# Patient Record
Sex: FEMALE | Race: WHITE | Employment: FULL TIME | URBAN - METROPOLITAN AREA
[De-identification: names, ages, dates, MRNs, and addresses within clinical notes are randomized per-mention and may not be internally consistent; named-entity substitution may affect disease eponyms.]

---

## 2017-07-31 LAB
CHLAMYDIA, EXTERNAL: NEGATIVE
HBSAG, EXTERNAL: NEGATIVE
HIV, EXTERNAL: NEGATIVE
N. GONORRHEA, EXTERNAL: NEGATIVE
RPR, EXTERNAL: NORMAL
RUBELLA, EXTERNAL: NORMAL

## 2018-01-31 LAB — GRBS, EXTERNAL: POSITIVE

## 2018-03-01 ENCOUNTER — HOSPITAL ENCOUNTER (INPATIENT)
Age: 28
LOS: 1 days | Discharge: HOME | DRG: 560 | End: 2018-03-02
Attending: OBSTETRICS & GYNECOLOGY | Admitting: OBSTETRICS & GYNECOLOGY
Payer: MEDICAID

## 2018-03-01 ENCOUNTER — ANESTHESIA EVENT (OUTPATIENT)
Dept: LABOR AND DELIVERY | Age: 28
DRG: 560 | End: 2018-03-01
Payer: MEDICAID

## 2018-03-01 ENCOUNTER — ANESTHESIA (OUTPATIENT)
Dept: LABOR AND DELIVERY | Age: 28
DRG: 560 | End: 2018-03-01
Payer: MEDICAID

## 2018-03-01 PROBLEM — K21.9 GERD (GASTROESOPHAGEAL REFLUX DISEASE): Status: ACTIVE | Noted: 2018-03-01

## 2018-03-01 PROBLEM — O47.9 UTERINE CONTRACTIONS DURING PREGNANCY: Status: ACTIVE | Noted: 2018-03-01

## 2018-03-01 PROBLEM — O09.293 PRIOR PREGNANCY WITH PLACENTA ABRUPTION, ANTEPARTUM, THIRD TRIMESTER: Status: ACTIVE | Noted: 2018-03-01

## 2018-03-01 LAB
ABO + RH BLD: NORMAL
BASOPHILS # BLD: 0 K/UL (ref 0–0.06)
BASOPHILS NFR BLD: 0 % (ref 0–2)
BLOOD GROUP ANTIBODIES SERPL: NORMAL
DIFFERENTIAL METHOD BLD: ABNORMAL
EOSINOPHIL # BLD: 0.1 K/UL (ref 0–0.4)
EOSINOPHIL NFR BLD: 0 % (ref 0–5)
ERYTHROCYTE [DISTWIDTH] IN BLOOD BY AUTOMATED COUNT: 13.8 % (ref 11.6–14.5)
HCT VFR BLD AUTO: 32.7 % (ref 35–45)
HGB BLD-MCNC: 10.6 G/DL (ref 12–16)
LYMPHOCYTES # BLD: 2.4 K/UL (ref 0.9–3.6)
LYMPHOCYTES NFR BLD: 14 % (ref 21–52)
MCH RBC QN AUTO: 27.5 PG (ref 24–34)
MCHC RBC AUTO-ENTMCNC: 32.4 G/DL (ref 31–37)
MCV RBC AUTO: 84.9 FL (ref 74–97)
MONOCYTES # BLD: 0.8 K/UL (ref 0.05–1.2)
MONOCYTES NFR BLD: 5 % (ref 3–10)
NEUTS SEG # BLD: 13.5 K/UL (ref 1.8–8)
NEUTS SEG NFR BLD: 81 % (ref 40–73)
PLATELET # BLD AUTO: 181 K/UL (ref 135–420)
PMV BLD AUTO: 9.8 FL (ref 9.2–11.8)
RBC # BLD AUTO: 3.85 M/UL (ref 4.2–5.3)
SPECIMEN EXP DATE BLD: NORMAL
WBC # BLD AUTO: 16.8 K/UL (ref 4.6–13.2)

## 2018-03-01 PROCEDURE — 75410000003 HC RECOV DEL/VAG/CSECN EA 0.5 HR

## 2018-03-01 PROCEDURE — 75410000002 HC LABOR FEE PER 1 HR

## 2018-03-01 PROCEDURE — 76060000078 HC EPIDURAL ANESTHESIA

## 2018-03-01 PROCEDURE — 74011000250 HC RX REV CODE- 250: Performed by: ADVANCED PRACTICE MIDWIFE

## 2018-03-01 PROCEDURE — 74011250636 HC RX REV CODE- 250/636

## 2018-03-01 PROCEDURE — 74011000258 HC RX REV CODE- 258: Performed by: ADVANCED PRACTICE MIDWIFE

## 2018-03-01 PROCEDURE — 74011250636 HC RX REV CODE- 250/636: Performed by: ADVANCED PRACTICE MIDWIFE

## 2018-03-01 PROCEDURE — 74011250637 HC RX REV CODE- 250/637: Performed by: ADVANCED PRACTICE MIDWIFE

## 2018-03-01 PROCEDURE — 4A0HXCZ MEASUREMENT OF PRODUCTS OF CONCEPTION, CARDIAC RATE, EXTERNAL APPROACH: ICD-10-PCS | Performed by: OBSTETRICS & GYNECOLOGY

## 2018-03-01 PROCEDURE — 00HU33Z INSERTION OF INFUSION DEVICE INTO SPINAL CANAL, PERCUTANEOUS APPROACH: ICD-10-PCS | Performed by: SPECIALIST

## 2018-03-01 PROCEDURE — 77030034849

## 2018-03-01 PROCEDURE — 65270000029 HC RM PRIVATE

## 2018-03-01 PROCEDURE — 74011000250 HC RX REV CODE- 250

## 2018-03-01 PROCEDURE — 77030018749 HC HK AMNIO DISP DERY -A

## 2018-03-01 PROCEDURE — 75410000000 HC DELIVERY VAGINAL/SINGLE

## 2018-03-01 PROCEDURE — 3E0R3BZ INTRODUCTION OF ANESTHETIC AGENT INTO SPINAL CANAL, PERCUTANEOUS APPROACH: ICD-10-PCS | Performed by: SPECIALIST

## 2018-03-01 PROCEDURE — 86900 BLOOD TYPING SEROLOGIC ABO: CPT | Performed by: ADVANCED PRACTICE MIDWIFE

## 2018-03-01 PROCEDURE — 77030007879 HC KT SPN EPDRL TELE -B: Performed by: SPECIALIST

## 2018-03-01 PROCEDURE — 85025 COMPLETE CBC W/AUTO DIFF WBC: CPT | Performed by: ADVANCED PRACTICE MIDWIFE

## 2018-03-01 PROCEDURE — 99282 EMERGENCY DEPT VISIT SF MDM: CPT

## 2018-03-01 PROCEDURE — 59025 FETAL NON-STRESS TEST: CPT

## 2018-03-01 RX ORDER — MINERAL OIL
30 OIL (ML) ORAL AS NEEDED
Status: COMPLETED | OUTPATIENT
Start: 2018-03-01 | End: 2018-03-01

## 2018-03-01 RX ORDER — FENTANYL CITRATE 50 UG/ML
100 INJECTION, SOLUTION INTRAMUSCULAR; INTRAVENOUS ONCE
Status: COMPLETED | OUTPATIENT
Start: 2018-03-01 | End: 2018-03-01

## 2018-03-01 RX ORDER — LIDOCAINE HYDROCHLORIDE 10 MG/ML
INJECTION INFILTRATION; PERINEURAL AS NEEDED
Status: DISCONTINUED | OUTPATIENT
Start: 2018-03-01 | End: 2018-03-01 | Stop reason: HOSPADM

## 2018-03-01 RX ORDER — FENTANYL/ROPIVACAINE/NS/PF 2MCG/ML-.1
1-15 PLASTIC BAG, INJECTION (ML) EPIDURAL
Status: DISCONTINUED | OUTPATIENT
Start: 2018-03-01 | End: 2018-03-01 | Stop reason: HOSPADM

## 2018-03-01 RX ORDER — OXYCODONE AND ACETAMINOPHEN 5; 325 MG/1; MG/1
2 TABLET ORAL
Status: DISCONTINUED | OUTPATIENT
Start: 2018-03-01 | End: 2018-03-02 | Stop reason: HOSPADM

## 2018-03-01 RX ORDER — OXYTOCIN/RINGER'S LACTATE 20/1000 ML
125 PLASTIC BAG, INJECTION (ML) INTRAVENOUS CONTINUOUS
Status: DISCONTINUED | OUTPATIENT
Start: 2018-03-01 | End: 2018-03-01 | Stop reason: HOSPADM

## 2018-03-01 RX ORDER — EPHEDRINE SULFATE 50 MG/ML
10 INJECTION, SOLUTION INTRAVENOUS AS NEEDED
Status: DISCONTINUED | OUTPATIENT
Start: 2018-03-01 | End: 2018-03-01 | Stop reason: HOSPADM

## 2018-03-01 RX ORDER — SODIUM CHLORIDE 0.9 % (FLUSH) 0.9 %
5-10 SYRINGE (ML) INJECTION AS NEEDED
Status: DISCONTINUED | OUTPATIENT
Start: 2018-03-01 | End: 2018-03-01 | Stop reason: HOSPADM

## 2018-03-01 RX ORDER — IBUPROFEN 400 MG/1
800 TABLET ORAL
Status: DISCONTINUED | OUTPATIENT
Start: 2018-03-01 | End: 2018-03-02 | Stop reason: HOSPADM

## 2018-03-01 RX ORDER — LIDOCAINE HYDROCHLORIDE 10 MG/ML
20 INJECTION, SOLUTION EPIDURAL; INFILTRATION; INTRACAUDAL; PERINEURAL AS NEEDED
Status: DISCONTINUED | OUTPATIENT
Start: 2018-03-01 | End: 2018-03-01 | Stop reason: HOSPADM

## 2018-03-01 RX ORDER — PROMETHAZINE HYDROCHLORIDE 25 MG/ML
25 INJECTION, SOLUTION INTRAMUSCULAR; INTRAVENOUS
Status: DISCONTINUED | OUTPATIENT
Start: 2018-03-01 | End: 2018-03-02 | Stop reason: HOSPADM

## 2018-03-01 RX ORDER — AMOXICILLIN 250 MG
1 CAPSULE ORAL
Status: DISCONTINUED | OUTPATIENT
Start: 2018-03-01 | End: 2018-03-02 | Stop reason: HOSPADM

## 2018-03-01 RX ORDER — BUTORPHANOL TARTRATE 2 MG/ML
2 INJECTION INTRAMUSCULAR; INTRAVENOUS
Status: DISCONTINUED | OUTPATIENT
Start: 2018-03-01 | End: 2018-03-01 | Stop reason: HOSPADM

## 2018-03-01 RX ORDER — PHENYLEPHRINE HCL IN 0.9% NACL 1 MG/10 ML
80 SYRINGE (ML) INTRAVENOUS AS NEEDED
Status: DISCONTINUED | OUTPATIENT
Start: 2018-03-01 | End: 2018-03-01 | Stop reason: HOSPADM

## 2018-03-01 RX ORDER — DIPHENHYDRAMINE HYDROCHLORIDE 50 MG/ML
25 INJECTION, SOLUTION INTRAMUSCULAR; INTRAVENOUS
Status: DISCONTINUED | OUTPATIENT
Start: 2018-03-01 | End: 2018-03-01 | Stop reason: HOSPADM

## 2018-03-01 RX ORDER — NALBUPHINE HYDROCHLORIDE 10 MG/ML
2.5 INJECTION, SOLUTION INTRAMUSCULAR; INTRAVENOUS; SUBCUTANEOUS
Status: DISCONTINUED | OUTPATIENT
Start: 2018-03-01 | End: 2018-03-01 | Stop reason: HOSPADM

## 2018-03-01 RX ORDER — ACETAMINOPHEN 325 MG/1
650 TABLET ORAL
Status: DISCONTINUED | OUTPATIENT
Start: 2018-03-01 | End: 2018-03-02 | Stop reason: HOSPADM

## 2018-03-01 RX ORDER — LIDOCAINE HYDROCHLORIDE 10 MG/ML
INJECTION INFILTRATION; PERINEURAL
Status: COMPLETED
Start: 2018-03-01 | End: 2018-03-01

## 2018-03-01 RX ORDER — FENTANYL CITRATE 50 UG/ML
INJECTION, SOLUTION INTRAMUSCULAR; INTRAVENOUS
Status: COMPLETED
Start: 2018-03-01 | End: 2018-03-01

## 2018-03-01 RX ORDER — OXYTOCIN/RINGER'S LACTATE 20/1000 ML
500 PLASTIC BAG, INJECTION (ML) INTRAVENOUS ONCE
Status: COMPLETED | OUTPATIENT
Start: 2018-03-01 | End: 2018-03-01

## 2018-03-01 RX ORDER — FENTANYL/ROPIVACAINE/NS/PF 2MCG/ML-.1
PLASTIC BAG, INJECTION (ML) EPIDURAL
Status: COMPLETED
Start: 2018-03-01 | End: 2018-03-01

## 2018-03-01 RX ORDER — HYDROMORPHONE HYDROCHLORIDE 2 MG/ML
1 INJECTION, SOLUTION INTRAMUSCULAR; INTRAVENOUS; SUBCUTANEOUS
Status: DISCONTINUED | OUTPATIENT
Start: 2018-03-01 | End: 2018-03-01 | Stop reason: HOSPADM

## 2018-03-01 RX ORDER — METHYLERGONOVINE MALEATE 0.2 MG/ML
0.2 INJECTION INTRAVENOUS AS NEEDED
Status: DISCONTINUED | OUTPATIENT
Start: 2018-03-01 | End: 2018-03-01 | Stop reason: HOSPADM

## 2018-03-01 RX ORDER — LIDOCAINE HYDROCHLORIDE 10 MG/ML
INJECTION, SOLUTION EPIDURAL; INFILTRATION; INTRACAUDAL; PERINEURAL AS NEEDED
Status: DISCONTINUED | OUTPATIENT
Start: 2018-03-01 | End: 2018-03-01 | Stop reason: HOSPADM

## 2018-03-01 RX ORDER — NALOXONE HYDROCHLORIDE 0.4 MG/ML
0.2 INJECTION, SOLUTION INTRAMUSCULAR; INTRAVENOUS; SUBCUTANEOUS AS NEEDED
Status: DISCONTINUED | OUTPATIENT
Start: 2018-03-01 | End: 2018-03-01 | Stop reason: HOSPADM

## 2018-03-01 RX ORDER — SODIUM CHLORIDE 0.9 % (FLUSH) 0.9 %
5-10 SYRINGE (ML) INJECTION EVERY 8 HOURS
Status: DISCONTINUED | OUTPATIENT
Start: 2018-03-01 | End: 2018-03-01 | Stop reason: HOSPADM

## 2018-03-01 RX ORDER — ZOLPIDEM TARTRATE 5 MG/1
5 TABLET ORAL
Status: DISCONTINUED | OUTPATIENT
Start: 2018-03-01 | End: 2018-03-02 | Stop reason: HOSPADM

## 2018-03-01 RX ORDER — SODIUM CHLORIDE, SODIUM LACTATE, POTASSIUM CHLORIDE, CALCIUM CHLORIDE 600; 310; 30; 20 MG/100ML; MG/100ML; MG/100ML; MG/100ML
125 INJECTION, SOLUTION INTRAVENOUS CONTINUOUS
Status: DISCONTINUED | OUTPATIENT
Start: 2018-03-01 | End: 2018-03-01 | Stop reason: HOSPADM

## 2018-03-01 RX ORDER — TERBUTALINE SULFATE 1 MG/ML
0.25 INJECTION SUBCUTANEOUS
Status: DISCONTINUED | OUTPATIENT
Start: 2018-03-01 | End: 2018-03-01 | Stop reason: HOSPADM

## 2018-03-01 RX ORDER — FENTANYL CITRATE 50 UG/ML
INJECTION, SOLUTION INTRAMUSCULAR; INTRAVENOUS AS NEEDED
Status: DISCONTINUED | OUTPATIENT
Start: 2018-03-01 | End: 2018-03-01 | Stop reason: HOSPADM

## 2018-03-01 RX ORDER — NALBUPHINE HYDROCHLORIDE 10 MG/ML
10 INJECTION, SOLUTION INTRAMUSCULAR; INTRAVENOUS; SUBCUTANEOUS
Status: DISCONTINUED | OUTPATIENT
Start: 2018-03-01 | End: 2018-03-01 | Stop reason: HOSPADM

## 2018-03-01 RX ADMIN — FAMOTIDINE 20 MG: 10 INJECTION, SOLUTION INTRAVENOUS at 16:03

## 2018-03-01 RX ADMIN — Medication 10000 MILLI-UNITS/HR: at 17:32

## 2018-03-01 RX ADMIN — SODIUM CHLORIDE 5 MILLION UNITS: 900 INJECTION INTRAVENOUS at 06:11

## 2018-03-01 RX ADMIN — SODIUM CHLORIDE, SODIUM LACTATE, POTASSIUM CHLORIDE, AND CALCIUM CHLORIDE 500 ML: 600; 310; 30; 20 INJECTION, SOLUTION INTRAVENOUS at 09:38

## 2018-03-01 RX ADMIN — FENTANYL CITRATE 100 MCG: 50 INJECTION, SOLUTION INTRAMUSCULAR; INTRAVENOUS at 10:19

## 2018-03-01 RX ADMIN — IBUPROFEN 800 MG: 400 TABLET, FILM COATED ORAL at 21:03

## 2018-03-01 RX ADMIN — ROPIVACAINE HYDROCHLORIDE 15 ML/HR: 10 INJECTION, SOLUTION EPIDURAL at 16:14

## 2018-03-01 RX ADMIN — Medication 125 ML/HR: at 18:50

## 2018-03-01 RX ADMIN — LIDOCAINE HYDROCHLORIDE 6 ML: 10 INJECTION INFILTRATION; PERINEURAL at 10:12

## 2018-03-01 RX ADMIN — FENTANYL CITRATE 100 MCG: 50 INJECTION, SOLUTION INTRAMUSCULAR; INTRAVENOUS at 10:14

## 2018-03-01 RX ADMIN — Medication 30 ML: at 17:25

## 2018-03-01 RX ADMIN — PENICILLIN G POTASSIUM 2.5 MILLION UNITS: 20000000 POWDER, FOR SOLUTION INTRAVENOUS at 10:14

## 2018-03-01 RX ADMIN — Medication 15 ML/HR: at 10:22

## 2018-03-01 RX ADMIN — PENICILLIN G POTASSIUM 2.5 MILLION UNITS: 20000000 POWDER, FOR SOLUTION INTRAVENOUS at 14:40

## 2018-03-01 RX ADMIN — Medication 15 ML/HR: at 16:14

## 2018-03-01 RX ADMIN — SODIUM CHLORIDE, SODIUM LACTATE, POTASSIUM CHLORIDE, AND CALCIUM CHLORIDE 125 ML/HR: 600; 310; 30; 20 INJECTION, SOLUTION INTRAVENOUS at 06:11

## 2018-03-01 RX ADMIN — ROPIVACAINE HYDROCHLORIDE 15 ML/HR: 10 INJECTION, SOLUTION EPIDURAL at 10:22

## 2018-03-01 RX ADMIN — LIDOCAINE HYDROCHLORIDE 5 ML: 10 INJECTION, SOLUTION EPIDURAL; INFILTRATION; INTRACAUDAL; PERINEURAL at 10:19

## 2018-03-01 NOTE — L&D DELIVERY NOTE
Delivery Note    Obstetrician:  Marilee Fair CNM    Assistant: Ney SHEPARD    Pre-Delivery Diagnosis: Term pregnancy, Spontaneous labor, Single fetus or Uncomplicated pregnancy    Post-Delivery Diagnosis: Living  infant(s) or Male    Intrapartum Event: None    Procedure: Spontaneous vaginal delivery    Epidural: YES    Monitor:  Fetal Heart Tones - External and Uterine Contractions - External    Indications for instrumental delivery: none    Estimated Blood Loss: 250    Episiotomy: none    Laceration(s):  1st degree    Laceration(s) repair: YES    Presentation: Cephalic    Fetal Description: arriaza    Fetal Position: Right Occiput Anterior    Birth Weight: 9#7    Birth Length: not yet assessed    Apgar - One Minute: 8    Apgar - Five Minutes: 8    Umbilical Cord: Nuchal Cord x  1 and 3 vessels present    Specimens: none           Complications:  none           Cord Blood Results:   Information for the patient's :  Vince Lena [[id number]]   No results found for: PCTABR, PCTDIG, BILI, ABORH    Prenatal Labs:     Lab Results   Component Value Date/Time    ABO/Rh(D) A POSITIVE 2018 05:00 AM    HBsAg, External Negative 2017    HIV, External Negative 2017    Rubella, External Immune 2017    RPR, External Non-Reactive 2017    Gonorrhea, External Negative 2017    Chlamydia, External Negative 2017    GrBStrep, External Positive 2018        Attending Attestation: I was present and scrubbed for the entire procedure    Signed By:  Marilee Fair CNM     2018

## 2018-03-01 NOTE — ANESTHESIA PROCEDURE NOTES
Epidural Block    Start time: 3/1/2018 10:12 AM  End time: 3/1/2018 10:23 AM  Performed by: Ana Muniz by: Klaudia Caldera     Pre-Procedure  Indication: labor epidural    Preanesthetic Checklist: risks and benefits discussed and timeout performed      Epidural:   Patient position:  Seated  Prep region:  Lumbar  Prep: Chlorhexidine    Location:  L3-4    Needle and Epidural Catheter:   Needle Type:  Tuohy  Needle Gauge:  17 G  Injection Technique:  Loss of resistance using saline  Attempts:  1  Catheter Size:  20 G  Events: no blood with aspiration, no cerebrospinal fluid with aspiration, no paresthesia and negative aspiration test    Test Dose:  Negative    Assessment:   Catheter Secured:  Tegaderm and tape  Insertion:  Uncomplicated  Patient tolerance:  Patient tolerated the procedure well with no immediate complications          Russell Burr   = [id number]  CSN = [id number]    Epidural Catheter Placement    Risks, benefits, and alternatives discussed including:  A severe headache that can last for days or weeks  Permanent paralysis  Injury to baby  Chronic back pain    Time out performed, correct patient and site identified. Standard monitors applied. 1% lidocaine infiltrated,     Loss of Resistance @ 5 cm  NS 10 ml injected prior to catheter placement. Catheter 10 cm at skin. Negative test dose through filter. Secured  BP check  Pump Started  Patient tolerated the procedure well, VSS throughout, no apparent complications.      Russell Burr   = [id number]  CSN = [id number]

## 2018-03-01 NOTE — H&P
History & Physical    Name: Jame Oneal MRN: [id number]  SSN: [id number]    YOB: 1990  Age: 28 y.o. Sex: female        Subjective:     Estimated Date of Delivery: 3/3/18  OB History      Para Term  AB Living    3 1 1  1 1    SAB TAB Ectopic Molar Multiple Live Births         1            Ms. Roni Campbell is admitted with pregnancy at 39w5d for active labor. Prenatal course was complicated by Fetal arrhythmia and GERD. Patient has hx of previous  section for placental abruption. Please see prenatal records for details. Patient desires TOLAC. Allergies   Allergen Reactions    Latex Rash     Rubber gloves with powder    Sulfa (Sulfonamide Antibiotics) Other (comments)     Joints swelled up and bruised       Objective:     Vitals:  Vitals:    18 0152 18 0156   BP:  115/73   Pulse:  80   Resp:  18   Temp:  97.7 °F (36.5 °C)   SpO2:  100%   Weight: 90.7 kg (200 lb)    Height: 5' 4\" (1.626 m)         Physical Exam:  Patient without distress. Back: costovertebral angle tenderness absent  Abdomen: soft, nontender  Fundus: soft and non tender  Perineum: blood absent, amniotic fluid absent  Cervical Exam: 3 cm/ posterior per nurse exam  Membranes:  Intact  Fetal Heart Rate & Contraction pattern: Reactive  Baseline: 140 per minute  Variability: moderate  Accelerations: yes  Decelerations: none  Uterine contractions: regular, every 2-5 minutes    Prenatal Labs:   Lab Results   Component Value Date/Time    Rubella, External Immune 2017    GrBStrep, External Positive 2018    HBsAg, External Negative 2017    HIV, External Negative 2017    RPR, External Non-Reactive 2017    Gonorrhea, External Negative 2017    Chlamydia, External Negative 2017         Assessment/Plan:     Plan: Admit for Reassuring fetal status, Labor  Continue expectant management, Continue plan for vaginal delivery.   Group B Strep was positive, use of prophylactic antibiotics not indicated.      Signed By:  Shi Schmidt CNM     March 1, 2018

## 2018-03-01 NOTE — ANESTHESIA PREPROCEDURE EVALUATION
Anesthetic History        Pertinent negatives: No PONV  Comments: itches     Review of Systems / Medical History  Patient summary reviewed, nursing notes reviewed and pertinent labs reviewed    Pulmonary              Pertinent negatives: No COPD, asthma and smoker     Neuro/Psych   Within defined limits           Cardiovascular  Within defined limits              Pertinent negatives: No hypertension, past MI and CAD       GI/Hepatic/Renal     GERD        Pertinent negatives: No liver disease and renal disease   Endo/Other        Arthritis  Pertinent negatives: No diabetes   Other Findings            Physical Exam    Airway  Mallampati: IV  TM Distance: 4 - 6 cm  Neck ROM: decreased range of motion   Mouth opening: Normal    Comments: ? Poor effort? Cardiovascular               Dental         Pulmonary                 Abdominal         Other Findings            Anesthetic Plan    ASA: 1  Anesthesia type: epidural            Anesthetic plan and risks discussed with: Patient            Epidural Risks    Risks, benefits, and alternatives discussed including:  Some work better than others. Some can work for a while then quit. A severe headache that can last for days or weeks  Permanent paralysis  Injury to baby  Chronic back pain    no questions.

## 2018-03-01 NOTE — PROGRESS NOTES
Labor Progress Note  Patient seen, fetal heart rate and contraction pattern evaluated, patient examined. No data found.       Physical Exam:  Cervical Exam:  4 cm dilated    80% effaced    -2 station    Cervical Position: mid position  Membranes:  Intact  Uterine Activity: Frequency: Every 2-4 minutes, Duration: 60-90 seconds and Intensity: strong  Fetal Heart Rate: Baseline: 130 per minute  Variability: moderate  Accelerations: yes  Decelerations: none    Assessment/Plan:  Reassuring fetal status, Labor  Progressing normally  Continue expectant management, Continue plan for vaginal delivery

## 2018-03-01 NOTE — PROGRESS NOTES
Labor Progress Note  Patient seen, fetal heart rate and contraction pattern evaluated, patient examined. No data found. Physical Exam:  Cervical Exam:  5 cm dilated    90% effaced    -1 station    Membranes:  Artificial Rupture of Membranes;  Amniotic Fluid: small amount of clear fluid  Uterine Activity: Frequency: Every 5 minutes, Duration: 60-80 seconds and Intensity: strong  Fetal Heart Rate: Baseline: 135 per minute  Variability: moderate  Accelerations: yes  Decelerations: none    Assessment/Plan:  Reassuring fetal status, Labor  Progressing normally  Continue expectant management, Continue plan for vaginal delivery

## 2018-03-01 NOTE — PROGRESS NOTES
0145-Pt arrives to unit at this time with c/o contractions 5min apart. Pt  at 39w5d gestation. TOLAC pt. Pt denies gush or leakage of fluid and vaginal discharge. Pt does report spotting since this morning. Pt denies headache, SOB, N/V, epigastric pain and visual disturbances. Pt placed in [address] [facility] for assessment. 0210-SVE 1-2/60/-3 posterior. CNM on unit KIMBER Pompa updated on pt condition. Once EFM is reactive, pt will walk. 0300-Per CNM pt off monitor walking. Will recheck cervix at 0400    0415-SVE 3-4/60/-3. Orders to admit to inpatient per CNM.  Pt transferred to  1 for admission    0530-Birthing ball provided to pt

## 2018-03-01 NOTE — PROGRESS NOTES
0710 Bedside report received from LIONEL Leon RN     2508 Assessment complete. Care plan reviewed. [address] [facility] SLIME Garcia and DREA Roach at bedside. Careplan reviewed. SVE: 4/80/-2    0815 Pt saline locked. Switched to wireless monitors for increase mobility. Pad and mesh underwear provided. Pt standing at bedside. Denies further needs at this time. 0830 Pt up to walk unit. 0850 Pt standing at bedside. Denies needs. 4022 Pt requests epidural.     [id number] Dr. Antonio Pittman paged. 1010 Dr. Antonio Pittman at bedside explaining epidural procedure, side effects, risks, benefits, and positioning. Patient verbalizes understanding. Time-out: 1012  Procedure start: 2762  Catheter in, needle out: 1018  Fentanyl vial handed to MD at bedside. Loading dose: 1019  Patient connected to pump: 1022    1030 Pt positioned in semi-fowlers. US and TOCO adjusted. Denies needs. 1118 SVE:5/80/-1  AROM moderate amount of clear fluid. 1120 Pt positioned in high fowlers. US and TOCO adjusted. 1140 Pt placed on bedpan voided 500ml urine. 1145 Pt turned to left lateral with peanut ball in place. US and TOCO adjusted. Pt denies needs at this time. 1230 Pt turned to right lateral with peanut ball in place. US and TOCO adjusted. 1320 Pt positioned in high fowlers. Denies needs. 80 M. SLIME Garcia and DREA Roach at bedside SVE:7/100/-1 Pt turned to left lateral with peanut ball in place. 1700 Complete. 1703 Pushing. RN and SLIME at bedside monitoring FHTuntil delivery. 1 Delivery of viable male infant. Weak cry noted with tactile stimulation. Family member cut cord. Infant taken to warmer for assessment by ETHAN.     1736 Delivery of intact placenta. 1745 Olive-care, ice-pack, and pad provided. Linens changed. 1845 Pt up to restroom with minimal assistance. 1920 Bedside and verbal report given to KIMANI Vega

## 2018-03-01 NOTE — ROUTINE PROCESS
Bedside and verbal shift change report given to ASRIKA Toribio RN (oncoming nurse) by LIONEL Baxter RN (offgoing nurse). Report included the following information SBAR, Kardex and MAR.

## 2018-03-02 VITALS
OXYGEN SATURATION: 99 % | RESPIRATION RATE: 18 BRPM | WEIGHT: 200 LBS | HEART RATE: 81 BPM | BODY MASS INDEX: 34.15 KG/M2 | TEMPERATURE: 97.9 F | DIASTOLIC BLOOD PRESSURE: 51 MMHG | HEIGHT: 64 IN | SYSTOLIC BLOOD PRESSURE: 88 MMHG

## 2018-03-02 PROBLEM — O47.9 UTERINE CONTRACTIONS DURING PREGNANCY: Status: RESOLVED | Noted: 2018-03-01 | Resolved: 2018-03-02

## 2018-03-02 PROBLEM — O09.293 PRIOR PREGNANCY WITH PLACENTA ABRUPTION, ANTEPARTUM, THIRD TRIMESTER: Status: RESOLVED | Noted: 2018-03-01 | Resolved: 2018-03-02

## 2018-03-02 PROBLEM — K21.9 GERD (GASTROESOPHAGEAL REFLUX DISEASE): Status: RESOLVED | Noted: 2018-03-01 | Resolved: 2018-03-02

## 2018-03-02 PROBLEM — D62 ANEMIA ASSOCIATED WITH ACUTE BLOOD LOSS: Status: ACTIVE | Noted: 2018-03-02

## 2018-03-02 PROBLEM — O34.219 VAGINAL DELIVERY FOLLOWING PREVIOUS CESAREAN SECTION, DELIVERED: Status: ACTIVE | Noted: 2018-03-02

## 2018-03-02 LAB
HCT VFR BLD AUTO: 27.5 % (ref 35–45)
HGB BLD-MCNC: 8.8 G/DL (ref 12–16)

## 2018-03-02 PROCEDURE — 85018 HEMOGLOBIN: CPT

## 2018-03-02 PROCEDURE — 36415 COLL VENOUS BLD VENIPUNCTURE: CPT

## 2018-03-02 PROCEDURE — 85014 HEMATOCRIT: CPT

## 2018-03-02 PROCEDURE — 74011250637 HC RX REV CODE- 250/637: Performed by: ADVANCED PRACTICE MIDWIFE

## 2018-03-02 RX ORDER — LANOLIN ALCOHOL/MO/W.PET/CERES
1 CREAM (GRAM) TOPICAL 2 TIMES DAILY WITH MEALS
Status: DISCONTINUED | OUTPATIENT
Start: 2018-03-02 | End: 2018-03-02 | Stop reason: HOSPADM

## 2018-03-02 RX ORDER — DOCUSATE SODIUM 100 MG/1
100 CAPSULE, LIQUID FILLED ORAL 2 TIMES DAILY
Qty: 60 CAP | Refills: 2 | Status: SHIPPED | OUTPATIENT
Start: 2018-03-02 | End: 2018-05-31

## 2018-03-02 RX ORDER — LANOLIN ALCOHOL/MO/W.PET/CERES
325 CREAM (GRAM) TOPICAL 2 TIMES DAILY WITH MEALS
Qty: 60 TAB | Refills: 2 | Status: SHIPPED | OUTPATIENT
Start: 2018-03-02

## 2018-03-02 RX ORDER — IBUPROFEN 800 MG/1
800 TABLET ORAL
Qty: 90 TAB | Refills: 0 | Status: SHIPPED | OUTPATIENT
Start: 2018-03-02

## 2018-03-02 RX ADMIN — OXYCODONE HYDROCHLORIDE AND ACETAMINOPHEN 2 TABLET: 5; 325 TABLET ORAL at 00:12

## 2018-03-02 RX ADMIN — OXYCODONE HYDROCHLORIDE AND ACETAMINOPHEN 2 TABLET: 5; 325 TABLET ORAL at 07:20

## 2018-03-02 NOTE — DISCHARGE INSTRUCTIONS
POST DELIVERY DISCHARGE INSTRUCTIONS    Name: Phoebe Moyer  YOB: 1990  Primary Diagnosis: Principal Problem:    Vaginal delivery following previous  section, delivered (3/2/2018)    Active Problems:    Anemia associated with acute blood loss (3/2/2018)        General:     Diet/Diet Restrictions:  Eight 8-ounce glasses of fluid daily (water, juices); avoid excessive caffeine intake. Meals/snacks as desired which are high in fiber and carbohydrates and low in fat and cholesterol. Physical Activity / Restrictions / Safety:     Avoid heavy lifting, no more that 8 lbs. For 2-3 weeks; limit use of stairs to 2 times daily for the first week home. No driving for one week. Avoid intercourse 4-6 weeks, no douching or tampon use. Check with obstetrician before starting or resuming an exercise program.         Discharge Instructions/Special Treatment/Home Care Needs:     Continue prenatal vitamins. Continue to use squirt bottle with warm water on your episiotomy after each bathroom use until bleeding stops. If steri-strips applied to your incision, remove in 7-10 days. Call your doctor for the following:     Fever over 101 degrees by mouth. Vaginal bleeding heavier than a normal menstrual period or clot larger than a golf ball. Red streaks or increased swelling of legs, painful red streaks on your breast.  Painful urination, constipation and increased pain or swelling or discharge with your incision. If you feel extremely anxious or overwhelmed. If you have thoughts of harming yourself and/or your baby. Pain Management:     Pain Management:   Take Acetaminophen (Tylenol) or Ibuprofen (Advil, Motrin), as directed for pain. Use a warm Sitz bath 3 times daily to relieve episiotomy or hemorrhoidal discomfort. Heating pad to  incision as needed. For hemorrhoidal discomfort, use Tucks and Anusol cream as needed and directed. Follow-Up Care:      These are general instructions for a healthy lifestyle:    No smoking/ No tobacco products/ Avoid exposure to second hand smoke    Surgeon General's Warning:  Quitting smoking now greatly reduces serious risk to your health. Obesity, smoking, and sedentary lifestyle greatly increases your risk for illness    A healthy diet, regular physical exercise & weight monitoring are important for maintaining a healthy lifestyle    Recognize signs and symptoms of STROKE:    F-face looks uneven    A-arms unable to move or move unevenly    S-speech slurred or non-existent    T-time-call 911 as soon as signs and symptoms begin-DO NOT go       Back to bed or wait to see if you get better-TIME IS BRAIN.         Signed By: Karla Sifuentes RN                                                                                                   Date: 3/2/2018 Time: 11:34 AM

## 2018-03-02 NOTE — ANESTHESIA POSTPROCEDURE EVALUATION
Epidural removed by L&D staff  No sedation, pruritis noted. Adequate analgesia.   No obvious anesthesia complications  Patient discharged        Andrei Thomas CRNA

## 2018-03-02 NOTE — PROGRESS NOTES
1920:  Assumed care of patient from Tristan Do RN. Pt delivered at 1 Pt in room with family at bedside. No c/o pain at this time. Pt with pitocin currently infusing. 2103:  Pt c/o pain. PRN motrin administered by Radha Delgado RN.

## 2018-03-02 NOTE — ROUTINE PROCESS
TRANSFER - OUT REPORT:    Verbal report given to DAVID Anderson(name) on Paul Taveras  being transferred to [facility](unit) for routine progression of care       Report consisted of patients Situation, Background, Assessment and   Recommendations(SBAR). Information from the following report(s) Kardex and Recent Results was reviewed with the receiving nurse. Lines:   Peripheral IV 03/01/18 Right Forearm (Active)   Site Assessment Clean, dry, & intact 3/1/2018  7:32 PM   Phlebitis Assessment 0 3/1/2018  7:32 PM   Infiltration Assessment 0 3/1/2018  7:32 PM   Dressing Status Clean, dry, & intact 3/1/2018  7:32 PM   Dressing Type Tape;Transparent 3/1/2018  7:32 PM   Hub Color/Line Status Green 3/1/2018  7:15 AM   Action Taken Open ports on tubing capped 3/1/2018  6:07 AM   Alcohol Cap Used Yes 3/1/2018  7:15 AM        Opportunity for questions and clarification was provided.       Patient transported with:   Registered Nurse

## 2018-03-02 NOTE — PROGRESS NOTES
Bedside and Verbal shift change report given to KIMBER Nina RN (oncoming nurse) by ZAYRA Zavala RN (offgoing nurse). Report included the following information SBAR, Kardex, Intake/Output, MAR and Recent Results.

## 2018-03-02 NOTE — DISCHARGE SUMMARY
Obstetrical Discharge Summary     Name: Mary Samuel MRN: [id number]  SSN: [id number]    YOB: 1990  Age: 28 y.o. Sex: female      Admit Date: 3/1/2018    Discharge Date: 3/2/2018    Admitting Physician: Joseph Gusman MD     Attending Physician:  Mary Aiken MD     Discharge Diagnoses:   Information for the patient's :  Maddy Ano [[id number]]   Delivery of a 4.269 kg male infant via Vaginal, Spontaneous Delivery on 3/1/2018 at 5:28 PM  by . Apgars were 8 and 8. Additional Diagnoses:   Problem List as of 3/2/2018  Date Reviewed: 3/2/2018          Codes Class Noted - Resolved    * (Principal)Vaginal delivery following previous  section, delivered ICD-10-CM: O34.219  ICD-9-CM: 654.21  3/2/2018 - Present        Anemia associated with acute blood loss ICD-10-CM: D62  ICD-9-CM: 285.1  3/2/2018 - Present        Osteoarthrosis, unspecified whether generalized or localized, ankle and foot ICD-10-CM: M19.079  ICD-9-CM: 715.97  2012 - Present        RESOLVED: Uterine contractions during pregnancy ICD-10-CM: O62.2  ICD-9-CM: 661.20  3/1/2018 - 3/2/2018        RESOLVED: Prior pregnancy with placenta abruption, antepartum, third trimester ICD-10-CM: O09.293  ICD-9-CM: V23.49  3/1/2018 - 3/2/2018        RESOLVED: Encounter for trial of labor ICD-10-CM: O33.9  ICD-9-CM: 660.60  3/1/2018 - 3/2/2018        RESOLVED: GERD (gastroesophageal reflux disease) ICD-10-CM: K21.9  ICD-9-CM: 530.81  3/1/2018 - 3/2/2018              Lab Results   Component Value Date/Time    Rubella, External Immune 2017    GrBStrep, External Positive 2018     Recent Labs      18   0331   HGB  8.8*       Hospital Course: Normal hospital course following the delivery. Patient Instructions:   Current Discharge Medication List      START taking these medications    Details   docusate sodium (COLACE) 100 mg capsule Take 1 Cap by mouth two (2) times a day for 90 days.   Qty: 60 Cap, Refills: 2         CONTINUE these medications which have CHANGED    Details   ferrous sulfate 325 mg (65 mg iron) tablet Take 1 Tab by mouth two (2) times daily (with meals). Qty: 60 Tab, Refills: 2      ibuprofen (MOTRIN) 800 mg tablet Take 1 Tab by mouth every eight (8) hours as needed. Qty: 90 Tab, Refills: 0         CONTINUE these medications which have NOT CHANGED    Details   prenatal vit-fe fum-fa-dss (PRENATAL 19) 29-1 mg Tab Take 1 Tab by mouth daily. STOP taking these medications       oxyCODONE-acetaminophen (PERCOCET) 5-325 mg per tablet Comments:   Reason for Stopping:               Reference my discharge instructions.     Follow-up Appointments   Procedures    FOLLOW UP VISIT Appointment in: One Month     Standing Status:   Standing     Number of Occurrences:   1     Order Specific Question:   Appointment in     Answer:   One Month        Signed By:  Rita Mejía CNM     March 2, 2018                       BST

## 2018-03-02 NOTE — PROGRESS NOTES
Written and verbal discharge instructions given with verbalized understanding. Emphasis placed on \"post birth warning signs\". Allowed to ask questions/voice concerns- denies. Discharge via wheelchair to hospital exit with family.

## 2018-03-02 NOTE — PROGRESS NOTES
Post-Partum Day Number 1 Progress Note    Rajni Landa     Assessment:   Hospital Problems  Date Reviewed: 3/2/2018          Codes Class Noted POA    * (Principal)Vaginal delivery following previous  section, delivered ICD-10-CM: O34.219  ICD-9-CM: 654.21  3/2/2018 No        Anemia associated with acute blood loss ICD-10-CM: D62  ICD-9-CM: 285.1  3/2/2018 No            Doing well, post partum day 1    Plan:  1. Continue routine postpartum and perineal care as well as maternal education. 2. Infant transferred to [facility] and patient desires discharge ASAP. Will discharge this afternoon with iron, ibuprofen, and medications prior to admission and PNV. 3. Follow up in offfice in 4-6 weeks     Information for the patient's :  Raymond Sauer [[id number]]   Vaginal, Spontaneous Delivery   Patient doing well without significant complaint. Voiding without difficulty, normal lochia. Pain well controlled. Pumping. Infant transferred to [facility]. Desire discharge ASAP. Current Facility-Administered Medications   Medication Dose Route Frequency    ferrous sulfate tablet 325 mg  1 Tab Oral BID WITH MEALS       Vitals:  Visit Vitals    BP (!) 88/51 (BP 1 Location: Left arm, BP Patient Position: At rest)    Pulse 81    Temp 97.9 °F (36.6 °C)    Resp 18    Ht 5' 4\" (1.626 m)    Wt 90.7 kg (200 lb)    SpO2 99%    Breastfeeding Unknown    BMI 34.33 kg/m2     Temp (24hrs), Av.7 °F (37.1 °C), Min:97.9 °F (36.6 °C), Max:99.9 °F (37.7 °C)        Exam:   Patient without distress. Abdomen soft, fundus firm, nontender                Perineum with normal lochia noted.                 Lower extremities are negative ffor erythema, warmth, tenderness or edema     Labs:     Lab Results   Component Value Date/Time    WBC 16.8 (H) 2018 05:00 AM    WBC 19.1 (H) 2013 03:05 PM    HGB 8.8 (L) 2018 03:31 AM    HGB 10.6 (L) 2018 05:00 AM    HGB 6.3 (L) 2013 02:07 AM    HCT 27.5 (L) 03/02/2018 03:31 AM    HCT 32.7 (L) 03/01/2018 05:00 AM    HCT 18.9 (L) 03/21/2013 02:07 AM    PLATELET 719 07/08/3368 05:00 AM    PLATELET 017 46/44/9762 03:05 PM       Recent Results (from the past 24 hour(s))   HEMOGLOBIN    Collection Time: 03/02/18  3:31 AM   Result Value Ref Range    HGB 8.8 (L) 12.0 - 16.0 g/dL   HEMATOCRIT    Collection Time: 03/02/18  3:31 AM   Result Value Ref Range    HCT 27.5 (L) 35.0 - 45.0 %

## 2022-03-18 PROBLEM — D62 ANEMIA ASSOCIATED WITH ACUTE BLOOD LOSS: Status: ACTIVE | Noted: 2018-03-02

## 2022-03-19 PROBLEM — O34.219 VAGINAL DELIVERY FOLLOWING PREVIOUS CESAREAN SECTION, DELIVERED: Status: ACTIVE | Noted: 2018-03-02

## 2023-06-02 ENCOUNTER — HOSPITAL ENCOUNTER (EMERGENCY)
Facility: HOSPITAL | Age: 33
Discharge: HOME | End: 2023-06-02
Attending: EMERGENCY MEDICINE
Payer: MEDICAID

## 2023-06-02 VITALS
TEMPERATURE: 97.7 F | WEIGHT: 190 LBS | OXYGEN SATURATION: 99 % | RESPIRATION RATE: 15 BRPM | BODY MASS INDEX: 32.44 KG/M2 | HEIGHT: 64 IN | SYSTOLIC BLOOD PRESSURE: 115 MMHG | DIASTOLIC BLOOD PRESSURE: 67 MMHG | HEART RATE: 76 BPM

## 2023-06-02 DIAGNOSIS — H53.419 VISUAL FIELD SCOTOMA, UNSPECIFIED LATERALITY: ICD-10-CM

## 2023-06-02 DIAGNOSIS — H53.9 VISUAL AURA: Primary | ICD-10-CM

## 2023-06-02 PROCEDURE — 99282 EMERGENCY DEPT VISIT SF MDM: CPT | Performed by: EMERGENCY MEDICINE

## 2023-06-02 ASSESSMENT — LIFESTYLE VARIABLES
HOW OFTEN DO YOU HAVE A DRINK CONTAINING ALCOHOL: NEVER
HOW MANY STANDARD DRINKS CONTAINING ALCOHOL DO YOU HAVE ON A TYPICAL DAY: PATIENT DOES NOT DRINK

## 2023-06-02 ASSESSMENT — PAIN - FUNCTIONAL ASSESSMENT: PAIN_FUNCTIONAL_ASSESSMENT: NONE - DENIES PAIN

## 2023-06-03 NOTE — ED PROVIDER NOTES
below findings:     N/a     Interpretation per the Radiologist below, if available at the time of this note:    No orders to display         Procedures     Unless otherwise noted below, none. Procedures    ED Course     9:15 PM EDT I Nancy Mojica MD) am the first provider for this patient. Initial assessment performed. I reviewed the vital signs, available nursing notes, past medical history, past surgical history, family history and social history. The patients presenting problems have been discussed, and they are in agreement with the care plan formulated and outlined with them. I have encouraged them to ask questions as they arise throughout their visit. Records Reviewed: Nursing Notes    Is this patient to be included in the SEP-1 core measure due to severe sepsis or septic shock? No Exclusion criteria - the patient is NOT to be included for SEP-1 Core Measure due to: Infection is not suspected    MEDICATIONS ADMINISTERED IN THE ED:  Medications - No data to display         None    Screenings                  CIWA Assessment  BP: 115/67  Pulse: 76       Medical Decision Making     Preethi Pang is a 28 y.o. female who presents to the emergency department with visual changes, now resolved. They describe changes sound more central, rather than localized to a particular eye. It sounds as if they persisted regardless of which I was open. Initially in the left visual field, but then more central as they evolved. Given this fact and the fact that it was followed by headache, this sounds like a visual aura preceding a migraine. The headache is very minor and is now nearly resolved. The visual changes are resolved. Visual acuity is normal.  Patient declines any medications. Bedside ocular ultrasound performed and no signs of retinal detachment or posterior vitreous hemorrhage. Recommended she call her eye doctor on Monday for follow-up. Return precautions provided.     The decision to perform testing

## 2023-06-03 NOTE — ED NOTES
D/C INSTRUCTIONS GIVEN TO PT. PT VERBALIZED UNDERSTANDING, AND STATES SHE IS GOING TO FOLLOW UP WITH EYE DR. PT AND MINOR CHILD AMBULATORY OUT OF ER.      Brayan Maurer RN  06/02/23 0938

## 2023-06-03 NOTE — ED NOTES
PT Rico. Miguel Medeiros 74 \"FUZZINESS\" TO LT PERIPHERAL VISION AND PAIN TO BACK OF HEAD. VISION TEST DONE. PT STATES SHE IS WEARING CONTACTS.     LT: 20/10  RT: 20/10  BOTH EYES: 20/10     Raghavendra Bran RN  06/02/23 2100

## 2023-06-03 NOTE — DISCHARGE INSTRUCTIONS
You were seen today with visual symptoms that to me sound like an ocular migraine, which have now resolved. Please follow up with your primary care doctor and eye doctor. Come back to the ER if you have vision loss.

## 2025-03-18 ENCOUNTER — HOSPITAL ENCOUNTER (EMERGENCY)
Facility: HOSPITAL | Age: 35
Discharge: HOME | End: 2025-03-18
Payer: MEDICAID

## 2025-03-18 ENCOUNTER — APPOINTMENT (OUTPATIENT)
Facility: HOSPITAL | Age: 35
End: 2025-03-18
Payer: MEDICAID

## 2025-03-18 VITALS
BODY MASS INDEX: 32.78 KG/M2 | OXYGEN SATURATION: 99 % | TEMPERATURE: 98.2 F | SYSTOLIC BLOOD PRESSURE: 103 MMHG | DIASTOLIC BLOOD PRESSURE: 71 MMHG | RESPIRATION RATE: 16 BRPM | HEIGHT: 64 IN | HEART RATE: 84 BPM | WEIGHT: 192 LBS

## 2025-03-18 DIAGNOSIS — R11.2 NAUSEA VOMITING AND DIARRHEA: Primary | ICD-10-CM

## 2025-03-18 DIAGNOSIS — R19.7 NAUSEA VOMITING AND DIARRHEA: Primary | ICD-10-CM

## 2025-03-18 LAB
ALBUMIN SERPL-MCNC: 3.6 G/DL (ref 3.4–5)
ALBUMIN/GLOB SERPL: 1.1 (ref 0.8–1.7)
ALP SERPL-CCNC: 59 U/L (ref 45–117)
ALT SERPL-CCNC: 60 U/L (ref 13–56)
ANION GAP SERPL CALC-SCNC: 7 MMOL/L (ref 3–18)
APPEARANCE UR: CLEAR
AST SERPL-CCNC: 27 U/L (ref 10–38)
BACTERIA URNS QL MICRO: ABNORMAL /HPF
BASOPHILS # BLD: 0.03 K/UL (ref 0–0.1)
BASOPHILS NFR BLD: 0.3 % (ref 0–2)
BILIRUB SERPL-MCNC: 0.7 MG/DL (ref 0.2–1)
BILIRUB UR QL: NEGATIVE
BUN SERPL-MCNC: 11 MG/DL (ref 7–18)
BUN/CREAT SERPL: 20 (ref 12–20)
CALCIUM SERPL-MCNC: 8.7 MG/DL (ref 8.5–10.1)
CHLORIDE SERPL-SCNC: 106 MMOL/L (ref 100–111)
CO2 SERPL-SCNC: 24 MMOL/L (ref 21–32)
COLOR UR: ABNORMAL
CREAT SERPL-MCNC: 0.55 MG/DL (ref 0.6–1.3)
DIFFERENTIAL METHOD BLD: ABNORMAL
EOSINOPHIL # BLD: 0.01 K/UL (ref 0–0.4)
EOSINOPHIL NFR BLD: 0.1 % (ref 0–5)
EPITH CASTS URNS QL MICRO: ABNORMAL /LPF (ref 0–5)
ERYTHROCYTE [DISTWIDTH] IN BLOOD BY AUTOMATED COUNT: 12.9 % (ref 11.6–14.5)
GLOBULIN SER CALC-MCNC: 3.4 G/DL (ref 2–4)
GLUCOSE SERPL-MCNC: 112 MG/DL (ref 74–99)
GLUCOSE UR STRIP.AUTO-MCNC: NEGATIVE MG/DL
HCT VFR BLD AUTO: 39.1 % (ref 35–45)
HGB BLD-MCNC: 13.6 G/DL (ref 12–16)
HGB UR QL STRIP: NEGATIVE
IMM GRANULOCYTES # BLD AUTO: 0.07 K/UL (ref 0–0.04)
IMM GRANULOCYTES NFR BLD AUTO: 0.6 % (ref 0–0.5)
KETONES UR QL STRIP.AUTO: >160 MG/DL
LEUKOCYTE ESTERASE UR QL STRIP.AUTO: NEGATIVE
LYMPHOCYTES # BLD: 0.6 K/UL (ref 0.9–3.3)
LYMPHOCYTES NFR BLD: 5.2 % (ref 21–52)
MCH RBC QN AUTO: 31 PG (ref 24–34)
MCHC RBC AUTO-ENTMCNC: 34.8 G/DL (ref 31–37)
MCV RBC AUTO: 89.1 FL (ref 78–100)
MONOCYTES # BLD: 0.3 K/UL (ref 0.05–1.2)
MONOCYTES NFR BLD: 2.6 % (ref 3–10)
NEUTS SEG # BLD: 10.61 K/UL (ref 1.8–8)
NEUTS SEG NFR BLD: 91.2 % (ref 40–73)
NITRITE UR QL STRIP.AUTO: NEGATIVE
NRBC # BLD: 0 K/UL (ref 0–0.01)
NRBC BLD-RTO: 0 PER 100 WBC
PH UR STRIP: 5.5 (ref 5–8)
PLATELET # BLD AUTO: 259 K/UL (ref 135–420)
PMV BLD AUTO: 9.2 FL (ref 9.2–11.8)
POTASSIUM SERPL-SCNC: 3.5 MMOL/L (ref 3.5–5.5)
PROT SERPL-MCNC: 7 G/DL (ref 6.4–8.2)
PROT UR STRIP-MCNC: 30 MG/DL
RBC # BLD AUTO: 4.39 M/UL (ref 4.2–5.3)
RBC #/AREA URNS HPF: ABNORMAL /HPF (ref 0–5)
SODIUM SERPL-SCNC: 137 MMOL/L (ref 136–145)
SP GR UR REFRACTOMETRY: >1.03 (ref 1–1.03)
UROBILINOGEN UR QL STRIP.AUTO: 1 EU/DL (ref 0.2–1)
WBC # BLD AUTO: 11.6 K/UL (ref 4.6–13.2)
WBC URNS QL MICRO: ABNORMAL /HPF (ref 0–5)

## 2025-03-18 PROCEDURE — 2580000003 HC RX 258: Performed by: PHYSICIAN ASSISTANT

## 2025-03-18 PROCEDURE — 99284 EMERGENCY DEPT VISIT MOD MDM: CPT

## 2025-03-18 PROCEDURE — 93975 VASCULAR STUDY: CPT

## 2025-03-18 PROCEDURE — 85025 COMPLETE CBC W/AUTO DIFF WBC: CPT

## 2025-03-18 PROCEDURE — 6360000002 HC RX W HCPCS: Performed by: PHYSICIAN ASSISTANT

## 2025-03-18 PROCEDURE — 80053 COMPREHEN METABOLIC PANEL: CPT

## 2025-03-18 PROCEDURE — 81001 URINALYSIS AUTO W/SCOPE: CPT

## 2025-03-18 PROCEDURE — 96374 THER/PROPH/DIAG INJ IV PUSH: CPT

## 2025-03-18 RX ORDER — ONDANSETRON 4 MG/1
4 TABLET, ORALLY DISINTEGRATING ORAL 3 TIMES DAILY PRN
Qty: 21 TABLET | Refills: 0 | Status: SHIPPED | OUTPATIENT
Start: 2025-03-18

## 2025-03-18 RX ORDER — SODIUM CHLORIDE, SODIUM LACTATE, POTASSIUM CHLORIDE, AND CALCIUM CHLORIDE .6; .31; .03; .02 G/100ML; G/100ML; G/100ML; G/100ML
1000 INJECTION, SOLUTION INTRAVENOUS ONCE
Status: COMPLETED | OUTPATIENT
Start: 2025-03-18 | End: 2025-03-18

## 2025-03-18 RX ORDER — ONDANSETRON 2 MG/ML
4 INJECTION INTRAMUSCULAR; INTRAVENOUS
Status: COMPLETED | OUTPATIENT
Start: 2025-03-18 | End: 2025-03-18

## 2025-03-18 RX ADMIN — SODIUM CHLORIDE, SODIUM LACTATE, POTASSIUM CHLORIDE, AND CALCIUM CHLORIDE 1000 ML: .6; .31; .03; .02 INJECTION, SOLUTION INTRAVENOUS at 15:26

## 2025-03-18 RX ADMIN — ONDANSETRON 4 MG: 2 INJECTION, SOLUTION INTRAMUSCULAR; INTRAVENOUS at 15:28

## 2025-03-18 ASSESSMENT — PAIN SCALES - GENERAL: PAINLEVEL_OUTOF10: 3

## 2025-03-18 ASSESSMENT — PAIN DESCRIPTION - LOCATION: LOCATION: BACK;ABDOMEN

## 2025-03-18 ASSESSMENT — LIFESTYLE VARIABLES
HOW MANY STANDARD DRINKS CONTAINING ALCOHOL DO YOU HAVE ON A TYPICAL DAY: PATIENT DOES NOT DRINK
HOW OFTEN DO YOU HAVE A DRINK CONTAINING ALCOHOL: NEVER

## 2025-03-18 ASSESSMENT — PAIN - FUNCTIONAL ASSESSMENT: PAIN_FUNCTIONAL_ASSESSMENT: 0-10

## 2025-03-18 NOTE — DISCHARGE INSTRUCTIONS
Please follow-up closely with your OB/GYN.  If you have worsening of symptoms need to return to the emergency department

## 2025-03-18 NOTE — ED TRIAGE NOTES
Patient alert and conversational. Ambulated to triage. C/o abd pain n/v/d since 0500. Endorses bilateral lower back pain. Patient is pregnant 17 weeks     Active Ambulatory Problems     Diagnosis Date Noted    Anemia associated with acute blood loss 2018    Vaginal delivery following previous  section, delivered 2018     Resolved Ambulatory Problems     Diagnosis Date Noted    No Resolved Ambulatory Problems     No Additional Past Medical History

## 2025-03-18 NOTE — ED PROVIDER NOTES
parts of this dictation were completed with voice recognition software. Quite often unanticipated grammatical, syntax, homophones, and other interpretive errors are inadvertently transcribed by the computer software. Please disregards these errors. Please excuse any errors that have escaped final proofreading.)         Lala Acuna PA-C  03/20/25 1400

## 2025-06-27 ENCOUNTER — HOSPITAL ENCOUNTER (OUTPATIENT)
Facility: HOSPITAL | Age: 35
Discharge: HOME | End: 2025-06-27
Attending: OBSTETRICS & GYNECOLOGY | Admitting: OBSTETRICS & GYNECOLOGY
Payer: MEDICAID

## 2025-06-27 VITALS
HEART RATE: 78 BPM | SYSTOLIC BLOOD PRESSURE: 107 MMHG | DIASTOLIC BLOOD PRESSURE: 70 MMHG | TEMPERATURE: 98 F | RESPIRATION RATE: 18 BRPM | OXYGEN SATURATION: 97 %

## 2025-06-27 PROBLEM — Z33.1 IUP (INTRAUTERINE PREGNANCY), INCIDENTAL: Status: ACTIVE | Noted: 2025-06-27

## 2025-06-27 LAB
ALBUMIN SERPL-MCNC: 2.8 G/DL (ref 3.4–5)
ALBUMIN/GLOB SERPL: 0.9 (ref 0.8–1.7)
ALP SERPL-CCNC: 86 U/L (ref 45–117)
ALT SERPL-CCNC: 22 U/L (ref 10–35)
ANION GAP SERPL CALC-SCNC: 13 MMOL/L (ref 3–18)
APPEARANCE UR: CLEAR
AST SERPL-CCNC: 18 U/L (ref 10–38)
BACTERIA URNS QL MICRO: ABNORMAL /HPF
BASOPHILS # BLD: 0.03 K/UL (ref 0–0.1)
BASOPHILS NFR BLD: 0.3 % (ref 0–2)
BILIRUB SERPL-MCNC: 0.3 MG/DL (ref 0.2–1)
BILIRUB UR QL: NEGATIVE
BUN SERPL-MCNC: 7 MG/DL (ref 6–23)
BUN/CREAT SERPL: 16 (ref 12–20)
CALCIUM SERPL-MCNC: 8.9 MG/DL (ref 8.5–10.1)
CHLORIDE SERPL-SCNC: 103 MMOL/L (ref 98–107)
CO2 SERPL-SCNC: 20 MMOL/L (ref 21–32)
COLOR UR: ABNORMAL
CREAT SERPL-MCNC: 0.42 MG/DL (ref 0.6–1.3)
CREAT UR-MCNC: 251 MG/DL (ref 30–125)
DIFFERENTIAL METHOD BLD: ABNORMAL
EOSINOPHIL # BLD: 0.08 K/UL (ref 0–0.4)
EOSINOPHIL NFR BLD: 0.8 % (ref 0–5)
EPITH CASTS URNS QL MICRO: ABNORMAL /LPF (ref 0–5)
ERYTHROCYTE [DISTWIDTH] IN BLOOD BY AUTOMATED COUNT: 12.6 % (ref 11.6–14.5)
GLOBULIN SER CALC-MCNC: 3.1 G/DL (ref 2–4)
GLUCOSE SERPL-MCNC: 88 MG/DL (ref 74–108)
GLUCOSE UR STRIP.AUTO-MCNC: 250 MG/DL
HCT VFR BLD AUTO: 31.6 % (ref 35–45)
HGB BLD-MCNC: 10.5 G/DL (ref 12–16)
HGB UR QL STRIP: NEGATIVE
IMM GRANULOCYTES # BLD AUTO: 0.07 K/UL (ref 0–0.04)
IMM GRANULOCYTES NFR BLD AUTO: 0.7 % (ref 0–0.5)
KETONES UR QL STRIP.AUTO: 15 MG/DL
LEUKOCYTE ESTERASE UR QL STRIP.AUTO: NEGATIVE
LYMPHOCYTES # BLD: 2.17 K/UL (ref 0.9–3.3)
LYMPHOCYTES NFR BLD: 21.1 % (ref 21–52)
MCH RBC QN AUTO: 28.4 PG (ref 24–34)
MCHC RBC AUTO-ENTMCNC: 33.2 G/DL (ref 31–37)
MCV RBC AUTO: 85.4 FL (ref 78–100)
MONOCYTES # BLD: 0.57 K/UL (ref 0.05–1.2)
MONOCYTES NFR BLD: 5.5 % (ref 3–10)
MUCOUS THREADS URNS QL MICRO: ABNORMAL /LPF
NEUTS SEG # BLD: 7.37 K/UL (ref 1.8–8)
NEUTS SEG NFR BLD: 71.6 % (ref 40–73)
NITRITE UR QL STRIP.AUTO: NEGATIVE
NRBC # BLD: 0 K/UL (ref 0–0.01)
NRBC BLD-RTO: 0 PER 100 WBC
PH UR STRIP: 6 (ref 5–8)
PLATELET # BLD AUTO: 245 K/UL (ref 135–420)
PMV BLD AUTO: 9.8 FL (ref 9.2–11.8)
POTASSIUM SERPL-SCNC: 3.8 MMOL/L (ref 3.5–5.5)
PROT SERPL-MCNC: 5.8 G/DL (ref 6.4–8.2)
PROT UR STRIP-MCNC: 30 MG/DL
PROT UR-MCNC: 27 MG/DL (ref 0–12)
PROT/CREAT UR-RTO: 0.1
RBC # BLD AUTO: 3.7 M/UL (ref 4.2–5.3)
RBC #/AREA URNS HPF: ABNORMAL /HPF (ref 0–5)
SODIUM SERPL-SCNC: 137 MMOL/L (ref 136–145)
SP GR UR REFRACTOMETRY: >1.03 (ref 1–1.03)
UROBILINOGEN UR QL STRIP.AUTO: 1 EU/DL (ref 0.2–1)
WBC # BLD AUTO: 10.3 K/UL (ref 4.6–13.2)
WBC URNS QL MICRO: ABNORMAL /HPF (ref 0–5)

## 2025-06-27 PROCEDURE — 2580000003 HC RX 258: Performed by: ADVANCED PRACTICE MIDWIFE

## 2025-06-27 PROCEDURE — 80053 COMPREHEN METABOLIC PANEL: CPT

## 2025-06-27 PROCEDURE — 59025 FETAL NON-STRESS TEST: CPT

## 2025-06-27 PROCEDURE — 99202 OFFICE O/P NEW SF 15 MIN: CPT

## 2025-06-27 PROCEDURE — 6370000000 HC RX 637 (ALT 250 FOR IP): Performed by: ADVANCED PRACTICE MIDWIFE

## 2025-06-27 PROCEDURE — 85025 COMPLETE CBC W/AUTO DIFF WBC: CPT

## 2025-06-27 PROCEDURE — 82570 ASSAY OF URINE CREATININE: CPT

## 2025-06-27 PROCEDURE — 81001 URINALYSIS AUTO W/SCOPE: CPT

## 2025-06-27 PROCEDURE — 84156 ASSAY OF PROTEIN URINE: CPT

## 2025-06-27 PROCEDURE — 84550 ASSAY OF BLOOD/URIC ACID: CPT

## 2025-06-27 RX ORDER — BUTALBITAL, ACETAMINOPHEN AND CAFFEINE 50; 325; 40 MG/1; MG/1; MG/1
1 TABLET ORAL ONCE
Status: COMPLETED | OUTPATIENT
Start: 2025-06-27 | End: 2025-06-27

## 2025-06-27 RX ORDER — SODIUM CHLORIDE, SODIUM LACTATE, POTASSIUM CHLORIDE, AND CALCIUM CHLORIDE .6; .31; .03; .02 G/100ML; G/100ML; G/100ML; G/100ML
1000 INJECTION, SOLUTION INTRAVENOUS ONCE
Status: COMPLETED | OUTPATIENT
Start: 2025-06-27 | End: 2025-06-27

## 2025-06-27 RX ORDER — FAMOTIDINE 10 MG
10 TABLET ORAL 2 TIMES DAILY
COMMUNITY

## 2025-06-27 RX ADMIN — SODIUM CHLORIDE, SODIUM LACTATE, POTASSIUM CHLORIDE, AND CALCIUM CHLORIDE 1000 ML: .6; .31; .03; .02 INJECTION, SOLUTION INTRAVENOUS at 16:36

## 2025-06-27 RX ADMIN — BUTALBITAL, ACETAMINOPHEN, AND CAFFEINE 1 TABLET: 50; 325; 40 TABLET ORAL at 16:14

## 2025-06-27 NOTE — PROGRESS NOTES
1540- Pt presents to unit with complaints of a left sided headache for four days, unrelieved with tylenol and rest. Pt admits to increased swelling in BLE. She denies new visual disturbances, RUQ pain or dizziness. Pt denies contractions, vaginal bleeding or leaking fluid. She is feeling her baby move. She also expresses concerns over new onset dysphagia with eating and drinking recently. Pt states she often coughs or gags while trying to swallow. CNM updated on patient concerns and orders received.     1750- Discharge orders received.      1800- Discharge education reviewed with patient and spouse. No questions or concerns at this time. Pt encouraged to f/u in ER for dysphagia.

## 2025-06-27 NOTE — PROGRESS NOTES
Triage Progress Note      Patient presented to triage at 32+0 with c/o a headache, swelling, and trouble swallowing any food and water all the time. She reports +FM, she denies ROM, bleeding and contractions. She does report a h/o of acid reflux. She denies any trouble breathing.     Physical Exam:  Cervical Exam:  deferred  Membranes:  intact  Uterine Activity:  none  Fetal Heart Rate:  120 bpm  Variability:  moderate  Accelerations: +  Decelerations: absent  BLE: no swelling noted     Assessment/Plan:  Dehydration   Lactated Ringers 1000 mL fluid bolus    Headache   Fioricet 1 tab PO   PIH labs- negative    Trouble swallowing   To be evaluated in the ER    Reassuring fetal status, no s/s of labor. Plan d/c home with office f/u.    Bailey Rahman CNM

## 2025-06-27 NOTE — DISCHARGE INSTRUCTIONS
include shark, swordfish, surinder mackerel, marlin, orange roughy, and bigeye tuna, as well as tilefish from the [employer] [place].  If you smoke or vape, quit or cut back as much as you can. Talk to your doctor if you need help quitting.  If you use alcohol, marijuana, or other drugs, quit or cut back as much as you can. It's safest not to use them at all. Talk to your doctor if you need help quitting.  Follow your doctor's directions about activity. Your doctor will let you know how much exercise you can do.  Ask your doctor if you can have sex. If you are at risk for early labor, your doctor may ask you to not have sex.  Take care to avoid falling. Changes in your body during pregnancy, such as a growing belly, can make you more likely to fall. Sports such as bicycling, skiing, or in-line skating can increase your risk.  Avoid risky activities like horseback or motorcycle riding, water-skiing, scuba diving, and exercising at a high altitude (above 6,000 feet). If you live in a place with a high altitude, talk to your doctor about how you can exercise safely.  Avoid things that can make your body too hot and may be harmful to your pregnancy, such as a hot tub or sauna. Or talk with your doctor before doing anything that raises your body temperature. Your doctor can tell you if it's safe.  Do not take any over-the-counter or herbal medicines or supplements without talking to your doctor or pharmacist first.  When should you call for help?   Call 911  anytime you think you may need emergency care. For example, call if:    You passed out (lost consciousness).     You have a seizure.     You have severe vaginal bleeding. This means that you are soaking through your usual pads or tampons every hour for 2 or more hours.     You have severe pain in your belly or pelvis.     You have had fluid gushing or leaking from your vagina and you know or think the umbilical cord is bulging into your vagina. If this happens, immediately  get down on your knees so your rear end (buttocks) is higher than your head. This will decrease the pressure on the cord until help arrives.   Call your doctor now or seek immediate medical care if:    You have signs of preeclampsia, such as:  Sudden swelling of your face, hands, or feet.  New vision problems (such as dimness, blurring, or seeing spots).  A severe headache.     You have symptoms of a blood clot in your leg (called a deep vein thrombosis), such as:  Pain in the calf, back of the knee, thigh, or groin.  Swelling in the leg or groin.  A color change on the leg or groin. The skin may be reddish or purplish, depending on your usual skin color.     You have any vaginal bleeding.     You have belly pain or cramping.     You have a fever.     You've been having regular contractions for an hour. This means that you've had at least 6 contractions within 1 hour, even after you change your position and drink fluids.     You have symptoms of a urinary tract infection. These may include:  Pain or burning when you urinate.  A frequent need to urinate without being able to pass much urine.  Pain in your low back (below the rib cage and above the waist).  Blood in your urine.     You have a sudden release of fluid from your vagina.     You have low back pain or pelvic pressure that does not go away.     You notice that your baby has stopped moving or is moving less than normal.   Watch closely for changes in your health, and be sure to contact your doctor if:    You have vaginal discharge that smells bad.     You feel sad, anxious, or hopeless for more than a few days.     You have other concerns about your pregnancy.   Where can you learn more?  Go to https://www.[employer].net/patientEd and enter Y951 to learn more about \"Pregnancy Precautions: Care Instructions.\"  Current as of: April 30, 2024  Content Version: 14.5  © 1725-1576 [employer].   Care instructions adapted under license by [employer]. If

## 2025-06-28 LAB — URATE SERPL-MCNC: 3.5 MG/DL (ref 2.6–7.2)

## 2025-08-26 ENCOUNTER — HOSPITAL ENCOUNTER (INPATIENT)
Facility: HOSPITAL | Age: 35
LOS: 2 days | Discharge: HOME | End: 2025-08-28
Attending: OBSTETRICS & GYNECOLOGY | Admitting: OBSTETRICS & GYNECOLOGY
Payer: MEDICAID

## 2025-08-26 PROBLEM — Z98.891 HISTORY OF VAGINAL BIRTH AFTER CESAREAN: Status: ACTIVE | Noted: 2025-08-26

## 2025-08-26 PROBLEM — O34.219 HISTORY OF CESAREAN DELIVERY AFFECTING PREGNANCY: Status: ACTIVE | Noted: 2025-08-26

## 2025-08-26 PROBLEM — D62 ANEMIA ASSOCIATED WITH ACUTE BLOOD LOSS: Status: RESOLVED | Noted: 2018-03-02 | Resolved: 2025-08-26

## 2025-08-26 PROBLEM — O09.529 ADVANCED MATERNAL AGE IN MULTIGRAVIDA: Status: ACTIVE | Noted: 2025-08-26

## 2025-08-26 PROBLEM — O34.219 VAGINAL DELIVERY FOLLOWING PREVIOUS CESAREAN SECTION, DELIVERED: Status: RESOLVED | Noted: 2018-03-02 | Resolved: 2025-08-26

## 2025-08-26 PROBLEM — O99.820 GROUP B STREPTOCOCCAL CARRIAGE COMPLICATING PREGNANCY: Status: ACTIVE | Noted: 2025-08-26

## 2025-08-26 PROBLEM — K21.9 GERD (GASTROESOPHAGEAL REFLUX DISEASE): Status: ACTIVE | Noted: 2025-08-26

## 2025-08-26 PROBLEM — Z3A.40 40 WEEKS GESTATION OF PREGNANCY: Status: ACTIVE | Noted: 2025-08-26

## 2025-08-26 PROCEDURE — 6360000002 HC RX W HCPCS

## 2025-08-26 PROCEDURE — 86900 BLOOD TYPING SEROLOGIC ABO: CPT

## 2025-08-26 PROCEDURE — 81003 URINALYSIS AUTO W/O SCOPE: CPT

## 2025-08-26 PROCEDURE — 2580000003 HC RX 258

## 2025-08-26 PROCEDURE — 85025 COMPLETE CBC W/AUTO DIFF WBC: CPT

## 2025-08-26 PROCEDURE — 86901 BLOOD TYPING SEROLOGIC RH(D): CPT

## 2025-08-26 PROCEDURE — 86592 SYPHILIS TEST NON-TREP QUAL: CPT

## 2025-08-26 PROCEDURE — 1100000000 HC RM PRIVATE

## 2025-08-26 PROCEDURE — 86850 RBC ANTIBODY SCREEN: CPT

## 2025-08-26 PROCEDURE — 81001 URINALYSIS AUTO W/SCOPE: CPT

## 2025-08-26 PROCEDURE — 99203 OFFICE O/P NEW LOW 30 MIN: CPT

## 2025-08-26 RX ORDER — ONDANSETRON 2 MG/ML
4 INJECTION INTRAMUSCULAR; INTRAVENOUS EVERY 6 HOURS PRN
Status: DISCONTINUED | OUTPATIENT
Start: 2025-08-26 | End: 2025-08-27 | Stop reason: SDUPTHER

## 2025-08-26 RX ORDER — TRANEXAMIC ACID 10 MG/ML
1000 INJECTION, SOLUTION INTRAVENOUS
Status: DISCONTINUED | OUTPATIENT
Start: 2025-08-26 | End: 2025-08-27

## 2025-08-26 RX ORDER — CARBOPROST TROMETHAMINE 250 UG/ML
250 INJECTION, SOLUTION INTRAMUSCULAR PRN
Status: DISCONTINUED | OUTPATIENT
Start: 2025-08-26 | End: 2025-08-27

## 2025-08-26 RX ORDER — MISOPROSTOL 200 UG/1
400 TABLET ORAL PRN
Status: DISCONTINUED | OUTPATIENT
Start: 2025-08-26 | End: 2025-08-27 | Stop reason: SDUPTHER

## 2025-08-26 RX ORDER — METHYLERGONOVINE MALEATE 0.2 MG/ML
200 INJECTION INTRAVENOUS PRN
Status: DISCONTINUED | OUTPATIENT
Start: 2025-08-26 | End: 2025-08-27

## 2025-08-26 RX ORDER — SODIUM CHLORIDE, SODIUM LACTATE, POTASSIUM CHLORIDE, AND CALCIUM CHLORIDE .6; .31; .03; .02 G/100ML; G/100ML; G/100ML; G/100ML
500 INJECTION, SOLUTION INTRAVENOUS PRN
Status: DISCONTINUED | OUTPATIENT
Start: 2025-08-26 | End: 2025-08-27

## 2025-08-26 RX ORDER — SODIUM CHLORIDE, SODIUM LACTATE, POTASSIUM CHLORIDE, CALCIUM CHLORIDE 600; 310; 30; 20 MG/100ML; MG/100ML; MG/100ML; MG/100ML
INJECTION, SOLUTION INTRAVENOUS CONTINUOUS
Status: DISCONTINUED | OUTPATIENT
Start: 2025-08-26 | End: 2025-08-27

## 2025-08-26 RX ORDER — LOPERAMIDE HYDROCHLORIDE 2 MG/1
2 CAPSULE ORAL PRN
Status: DISCONTINUED | OUTPATIENT
Start: 2025-08-26 | End: 2025-08-27

## 2025-08-26 RX ORDER — SODIUM CHLORIDE, SODIUM LACTATE, POTASSIUM CHLORIDE, AND CALCIUM CHLORIDE .6; .31; .03; .02 G/100ML; G/100ML; G/100ML; G/100ML
1000 INJECTION, SOLUTION INTRAVENOUS PRN
Status: DISCONTINUED | OUTPATIENT
Start: 2025-08-26 | End: 2025-08-27

## 2025-08-26 RX ORDER — ONDANSETRON 4 MG/1
4 TABLET, ORALLY DISINTEGRATING ORAL EVERY 6 HOURS PRN
Status: DISCONTINUED | OUTPATIENT
Start: 2025-08-26 | End: 2025-08-27 | Stop reason: SDUPTHER

## 2025-08-26 RX ORDER — SODIUM CHLORIDE, SODIUM LACTATE, POTASSIUM CHLORIDE, AND CALCIUM CHLORIDE .6; .31; .03; .02 G/100ML; G/100ML; G/100ML; G/100ML
500 INJECTION, SOLUTION INTRAVENOUS ONCE
Status: DISCONTINUED | OUTPATIENT
Start: 2025-08-26 | End: 2025-08-27

## 2025-08-26 RX ORDER — HYDROMORPHONE HYDROCHLORIDE 1 MG/ML
1 INJECTION, SOLUTION INTRAMUSCULAR; INTRAVENOUS; SUBCUTANEOUS EVERY 4 HOURS PRN
Status: DISCONTINUED | OUTPATIENT
Start: 2025-08-26 | End: 2025-08-27

## 2025-08-26 RX ORDER — ACETAMINOPHEN 325 MG/1
650 TABLET ORAL EVERY 4 HOURS PRN
Status: DISCONTINUED | OUTPATIENT
Start: 2025-08-26 | End: 2025-08-27

## 2025-08-26 RX ORDER — SEVOFLURANE 250 ML/250ML
1 LIQUID RESPIRATORY (INHALATION) CONTINUOUS PRN
Status: DISCONTINUED | OUTPATIENT
Start: 2025-08-26 | End: 2025-08-27

## 2025-08-26 RX ORDER — LIDOCAINE HYDROCHLORIDE 10 MG/ML
30 INJECTION, SOLUTION EPIDURAL; INFILTRATION; INTRACAUDAL; PERINEURAL PRN
Status: COMPLETED | OUTPATIENT
Start: 2025-08-26 | End: 2025-08-27

## 2025-08-26 RX ORDER — TERBUTALINE SULFATE 1 MG/ML
0.25 INJECTION SUBCUTANEOUS
Status: DISCONTINUED | OUTPATIENT
Start: 2025-08-26 | End: 2025-08-27

## 2025-08-26 RX ADMIN — SODIUM CHLORIDE, SODIUM LACTATE, POTASSIUM CHLORIDE, AND CALCIUM CHLORIDE: .6; .31; .03; .02 INJECTION, SOLUTION INTRAVENOUS at 23:52

## 2025-08-26 RX ADMIN — PENICILLIN G POTASSIUM 5 MILLION UNITS: 5000000 INJECTION, POWDER, FOR SOLUTION INTRAMUSCULAR; INTRAVENOUS at 23:52

## 2025-08-27 LAB
ABO + RH BLD: NORMAL
APPEARANCE UR: CLEAR
BACTERIA URNS QL MICRO: ABNORMAL /HPF
BASOPHILS # BLD: 0.06 K/UL (ref 0–0.1)
BASOPHILS NFR BLD: 0.4 % (ref 0–2)
BILIRUB UR QL: NEGATIVE
BLOOD GROUP ANTIBODIES SERPL: NORMAL
COLOR UR: YELLOW
DIFFERENTIAL METHOD BLD: ABNORMAL
EOSINOPHIL # BLD: 0.09 K/UL (ref 0–0.4)
EOSINOPHIL NFR BLD: 0.6 % (ref 0–5)
EPITH CASTS URNS QL MICRO: ABNORMAL /LPF (ref 0–5)
ERYTHROCYTE [DISTWIDTH] IN BLOOD BY AUTOMATED COUNT: 14 % (ref 11.6–14.5)
GLUCOSE UR STRIP.AUTO-MCNC: NEGATIVE MG/DL
HCT VFR BLD AUTO: 34.6 % (ref 35–45)
HGB BLD-MCNC: 11.2 G/DL (ref 12–16)
HGB UR QL STRIP: ABNORMAL
IMM GRANULOCYTES # BLD AUTO: 0.3 K/UL (ref 0–0.04)
IMM GRANULOCYTES NFR BLD AUTO: 2 % (ref 0–0.5)
KETONES UR QL STRIP.AUTO: ABNORMAL MG/DL
LEUKOCYTE ESTERASE UR QL STRIP.AUTO: NEGATIVE
LYMPHOCYTES # BLD: 3.17 K/UL (ref 0.9–3.3)
LYMPHOCYTES NFR BLD: 21.4 % (ref 21–52)
MCH RBC QN AUTO: 26.2 PG (ref 24–34)
MCHC RBC AUTO-ENTMCNC: 32.4 G/DL (ref 31–37)
MCV RBC AUTO: 81 FL (ref 78–100)
MONOCYTES # BLD: 0.88 K/UL (ref 0.05–1.2)
MONOCYTES NFR BLD: 5.9 % (ref 3–10)
NEUTS SEG # BLD: 10.34 K/UL (ref 1.8–8)
NEUTS SEG NFR BLD: 69.7 % (ref 40–73)
NITRITE UR QL STRIP.AUTO: NEGATIVE
NRBC # BLD: 0 K/UL (ref 0–0.01)
NRBC BLD-RTO: 0 PER 100 WBC
PH UR STRIP: 6 (ref 5–8)
PLATELET # BLD AUTO: 255 K/UL (ref 135–420)
PMV BLD AUTO: 10.5 FL (ref 9.2–11.8)
PROT UR STRIP-MCNC: ABNORMAL MG/DL
RBC # BLD AUTO: 4.27 M/UL (ref 4.2–5.3)
RBC #/AREA URNS HPF: ABNORMAL /HPF (ref 0–5)
RPR SER QL: NONREACTIVE
SP GR UR REFRACTOMETRY: 1.02 (ref 1–1.03)
SPECIMEN EXP DATE BLD: NORMAL
UROBILINOGEN UR QL STRIP.AUTO: 1 EU/DL (ref 0.2–1)
WBC # BLD AUTO: 14.8 K/UL (ref 4.6–13.2)
WBC URNS QL MICRO: ABNORMAL /HPF (ref 0–5)

## 2025-08-27 PROCEDURE — 0KQM0ZZ REPAIR PERINEUM MUSCLE, OPEN APPROACH: ICD-10-PCS | Performed by: OBSTETRICS & GYNECOLOGY

## 2025-08-27 PROCEDURE — 6360000002 HC RX W HCPCS

## 2025-08-27 PROCEDURE — 7210000100 HC LABOR FEE PER 1 HR

## 2025-08-27 PROCEDURE — 6370000000 HC RX 637 (ALT 250 FOR IP): Performed by: OBSTETRICS & GYNECOLOGY

## 2025-08-27 PROCEDURE — 6370000000 HC RX 637 (ALT 250 FOR IP)

## 2025-08-27 PROCEDURE — 7220000101 HC DELIVERY VAGINAL/SINGLE

## 2025-08-27 PROCEDURE — 6370000000 HC RX 637 (ALT 250 FOR IP): Performed by: ADVANCED PRACTICE MIDWIFE

## 2025-08-27 PROCEDURE — 4A1HXCZ MONITORING OF PRODUCTS OF CONCEPTION, CARDIAC RATE, EXTERNAL APPROACH: ICD-10-PCS | Performed by: OBSTETRICS & GYNECOLOGY

## 2025-08-27 PROCEDURE — 1100000000 HC RM PRIVATE

## 2025-08-27 PROCEDURE — 99203 OFFICE O/P NEW LOW 30 MIN: CPT

## 2025-08-27 RX ORDER — DOCUSATE SODIUM 100 MG/1
100 CAPSULE, LIQUID FILLED ORAL 2 TIMES DAILY
Status: DISCONTINUED | OUTPATIENT
Start: 2025-08-27 | End: 2025-08-28 | Stop reason: HOSPADM

## 2025-08-27 RX ORDER — MISOPROSTOL 200 UG/1
600 TABLET ORAL PRN
Status: DISCONTINUED | OUTPATIENT
Start: 2025-08-27 | End: 2025-08-28 | Stop reason: HOSPADM

## 2025-08-27 RX ORDER — FAMOTIDINE 20 MG/1
20 TABLET, FILM COATED ORAL 2 TIMES DAILY
Status: DISCONTINUED | OUTPATIENT
Start: 2025-08-27 | End: 2025-08-28 | Stop reason: HOSPADM

## 2025-08-27 RX ORDER — METHYLERGONOVINE MALEATE 0.2 MG/ML
200 INJECTION INTRAVENOUS PRN
Status: DISCONTINUED | OUTPATIENT
Start: 2025-08-27 | End: 2025-08-28 | Stop reason: HOSPADM

## 2025-08-27 RX ORDER — ONDANSETRON 2 MG/ML
4 INJECTION INTRAMUSCULAR; INTRAVENOUS EVERY 6 HOURS PRN
Status: DISCONTINUED | OUTPATIENT
Start: 2025-08-27 | End: 2025-08-28 | Stop reason: HOSPADM

## 2025-08-27 RX ORDER — ACETAMINOPHEN 500 MG
1000 TABLET ORAL EVERY 8 HOURS SCHEDULED
Status: DISCONTINUED | OUTPATIENT
Start: 2025-08-27 | End: 2025-08-28 | Stop reason: HOSPADM

## 2025-08-27 RX ORDER — SODIUM CHLORIDE, SODIUM LACTATE, POTASSIUM CHLORIDE, CALCIUM CHLORIDE 600; 310; 30; 20 MG/100ML; MG/100ML; MG/100ML; MG/100ML
INJECTION, SOLUTION INTRAVENOUS CONTINUOUS
Status: DISCONTINUED | OUTPATIENT
Start: 2025-08-27 | End: 2025-08-28 | Stop reason: HOSPADM

## 2025-08-27 RX ORDER — IBUPROFEN 400 MG/1
800 TABLET, FILM COATED ORAL EVERY 8 HOURS SCHEDULED
Status: DISCONTINUED | OUTPATIENT
Start: 2025-08-27 | End: 2025-08-27

## 2025-08-27 RX ORDER — LOPERAMIDE HYDROCHLORIDE 2 MG/1
2 CAPSULE ORAL PRN
Status: DISCONTINUED | OUTPATIENT
Start: 2025-08-27 | End: 2025-08-28 | Stop reason: HOSPADM

## 2025-08-27 RX ORDER — SIMETHICONE 80 MG
80 TABLET,CHEWABLE ORAL EVERY 6 HOURS PRN
Status: DISCONTINUED | OUTPATIENT
Start: 2025-08-27 | End: 2025-08-28 | Stop reason: HOSPADM

## 2025-08-27 RX ORDER — MISOPROSTOL 200 UG/1
800 TABLET ORAL PRN
Status: DISCONTINUED | OUTPATIENT
Start: 2025-08-27 | End: 2025-08-28 | Stop reason: HOSPADM

## 2025-08-27 RX ORDER — SENNA AND DOCUSATE SODIUM 50; 8.6 MG/1; MG/1
1 TABLET, FILM COATED ORAL DAILY PRN
Status: DISCONTINUED | OUTPATIENT
Start: 2025-08-27 | End: 2025-08-28 | Stop reason: HOSPADM

## 2025-08-27 RX ORDER — MAGNESIUM CARB/ALUMINUM HYDROX 105-160MG
TABLET,CHEWABLE ORAL
Status: DISCONTINUED
Start: 2025-08-27 | End: 2025-08-27 | Stop reason: WASHOUT

## 2025-08-27 RX ORDER — TRANEXAMIC ACID 10 MG/ML
1000 INJECTION, SOLUTION INTRAVENOUS
Status: DISCONTINUED | OUTPATIENT
Start: 2025-08-27 | End: 2025-08-28 | Stop reason: HOSPADM

## 2025-08-27 RX ORDER — DOCUSATE SODIUM 100 MG/1
100 CAPSULE, LIQUID FILLED ORAL 2 TIMES DAILY PRN
Status: DISCONTINUED | OUTPATIENT
Start: 2025-08-27 | End: 2025-08-27

## 2025-08-27 RX ORDER — ONDANSETRON 4 MG/1
4 TABLET, ORALLY DISINTEGRATING ORAL EVERY 6 HOURS PRN
Status: DISCONTINUED | OUTPATIENT
Start: 2025-08-27 | End: 2025-08-28 | Stop reason: HOSPADM

## 2025-08-27 RX ORDER — IBUPROFEN 400 MG/1
800 TABLET, FILM COATED ORAL EVERY 8 HOURS SCHEDULED
Status: DISCONTINUED | OUTPATIENT
Start: 2025-08-27 | End: 2025-08-28 | Stop reason: HOSPADM

## 2025-08-27 RX ORDER — CARBOPROST TROMETHAMINE 250 UG/ML
250 INJECTION, SOLUTION INTRAMUSCULAR PRN
Status: DISCONTINUED | OUTPATIENT
Start: 2025-08-27 | End: 2025-08-28 | Stop reason: HOSPADM

## 2025-08-27 RX ADMIN — IBUPROFEN 800 MG: 400 TABLET, FILM COATED ORAL at 02:00

## 2025-08-27 RX ADMIN — Medication 166.7 ML: at 00:53

## 2025-08-27 RX ADMIN — FAMOTIDINE 20 MG: 20 TABLET, FILM COATED ORAL at 10:39

## 2025-08-27 RX ADMIN — DOCUSATE SODIUM 100 MG: 100 CAPSULE, LIQUID FILLED ORAL at 21:08

## 2025-08-27 RX ADMIN — LIDOCAINE HYDROCHLORIDE 30 ML: 10 INJECTION, SOLUTION EPIDURAL; INFILTRATION; INTRACAUDAL; PERINEURAL at 01:00

## 2025-08-27 RX ADMIN — ACETAMINOPHEN 1000 MG: 500 TABLET ORAL at 05:47

## 2025-08-27 RX ADMIN — IBUPROFEN 800 MG: 400 TABLET, FILM COATED ORAL at 10:34

## 2025-08-27 RX ADMIN — FAMOTIDINE 20 MG: 20 TABLET, FILM COATED ORAL at 21:09

## 2025-08-27 RX ADMIN — IBUPROFEN 800 MG: 400 TABLET, FILM COATED ORAL at 18:40

## 2025-08-27 RX ADMIN — ACETAMINOPHEN 1000 MG: 500 TABLET ORAL at 21:08

## 2025-08-27 RX ADMIN — ACETAMINOPHEN 1000 MG: 500 TABLET ORAL at 15:05

## 2025-08-27 ASSESSMENT — PAIN DESCRIPTION - LOCATION
LOCATION: VAGINA;ABDOMEN
LOCATION: ABDOMEN
LOCATION: ABDOMEN
LOCATION: PERINEUM
LOCATION: PERINEUM;VAGINA
LOCATION: ABDOMEN

## 2025-08-27 ASSESSMENT — PAIN DESCRIPTION - ORIENTATION
ORIENTATION: LOWER

## 2025-08-27 ASSESSMENT — PAIN - FUNCTIONAL ASSESSMENT
PAIN_FUNCTIONAL_ASSESSMENT: ACTIVITIES ARE NOT PREVENTED
PAIN_FUNCTIONAL_ASSESSMENT: ACTIVITIES ARE NOT PREVENTED
PAIN_FUNCTIONAL_ASSESSMENT: 0-10
PAIN_FUNCTIONAL_ASSESSMENT: ACTIVITIES ARE NOT PREVENTED
PAIN_FUNCTIONAL_ASSESSMENT: 0-10
PAIN_FUNCTIONAL_ASSESSMENT: ACTIVITIES ARE NOT PREVENTED
PAIN_FUNCTIONAL_ASSESSMENT: ACTIVITIES ARE NOT PREVENTED
PAIN_FUNCTIONAL_ASSESSMENT: 0-10
PAIN_FUNCTIONAL_ASSESSMENT: ACTIVITIES ARE NOT PREVENTED

## 2025-08-27 ASSESSMENT — PAIN SCALES - GENERAL
PAINLEVEL_OUTOF10: 3
PAINLEVEL_OUTOF10: 2
PAINLEVEL_OUTOF10: 5
PAINLEVEL_OUTOF10: 2
PAINLEVEL_OUTOF10: 3

## 2025-08-27 ASSESSMENT — PAIN DESCRIPTION - DESCRIPTORS
DESCRIPTORS: CRAMPING
DESCRIPTORS: BURNING;CRAMPING;ACHING
DESCRIPTORS: CRAMPING
DESCRIPTORS: CRAMPING;SORE
DESCRIPTORS: CRAMPING;SORE
DESCRIPTORS: SORE

## 2025-08-28 VITALS
OXYGEN SATURATION: 99 % | TEMPERATURE: 99.1 F | HEART RATE: 72 BPM | BODY MASS INDEX: 36.7 KG/M2 | HEIGHT: 64 IN | DIASTOLIC BLOOD PRESSURE: 74 MMHG | SYSTOLIC BLOOD PRESSURE: 113 MMHG | RESPIRATION RATE: 17 BRPM | WEIGHT: 215 LBS

## 2025-08-28 PROBLEM — O34.219 VAGINAL BIRTH AFTER CESAREAN (VBAC): Status: ACTIVE | Noted: 2025-08-28

## 2025-08-28 PROBLEM — O34.219 HISTORY OF CESAREAN DELIVERY AFFECTING PREGNANCY: Status: RESOLVED | Noted: 2025-08-26 | Resolved: 2025-08-28

## 2025-08-28 PROBLEM — Z33.1 IUP (INTRAUTERINE PREGNANCY), INCIDENTAL: Status: RESOLVED | Noted: 2025-06-27 | Resolved: 2025-08-28

## 2025-08-28 PROBLEM — O99.820 GROUP B STREPTOCOCCAL CARRIAGE COMPLICATING PREGNANCY: Status: RESOLVED | Noted: 2025-08-26 | Resolved: 2025-08-28

## 2025-08-28 PROBLEM — Z98.891 HISTORY OF VAGINAL BIRTH AFTER CESAREAN: Status: RESOLVED | Noted: 2025-08-26 | Resolved: 2025-08-28

## 2025-08-28 PROBLEM — O09.529 ADVANCED MATERNAL AGE IN MULTIGRAVIDA: Status: RESOLVED | Noted: 2025-08-26 | Resolved: 2025-08-28

## 2025-08-28 PROBLEM — D62 ACUTE BLOOD LOSS ANEMIA (ABLA): Status: ACTIVE | Noted: 2025-08-28

## 2025-08-28 PROBLEM — Z3A.40 40 WEEKS GESTATION OF PREGNANCY: Status: RESOLVED | Noted: 2025-08-26 | Resolved: 2025-08-28

## 2025-08-28 LAB
ERYTHROCYTE [DISTWIDTH] IN BLOOD BY AUTOMATED COUNT: 14.1 % (ref 11.6–14.5)
HCT VFR BLD AUTO: 24.1 % (ref 35–45)
HGB BLD-MCNC: 7.8 G/DL (ref 12–16)
MCH RBC QN AUTO: 26.3 PG (ref 24–34)
MCHC RBC AUTO-ENTMCNC: 32.4 G/DL (ref 31–37)
MCV RBC AUTO: 81.1 FL (ref 78–100)
NRBC # BLD: 0 K/UL (ref 0–0.01)
NRBC BLD-RTO: 0 PER 100 WBC
PLATELET # BLD AUTO: 190 K/UL (ref 135–420)
PMV BLD AUTO: 10.3 FL (ref 9.2–11.8)
RBC # BLD AUTO: 2.97 M/UL (ref 4.2–5.3)
WBC # BLD AUTO: 11.9 K/UL (ref 4.6–13.2)

## 2025-08-28 PROCEDURE — 36415 COLL VENOUS BLD VENIPUNCTURE: CPT

## 2025-08-28 PROCEDURE — 6370000000 HC RX 637 (ALT 250 FOR IP): Performed by: ADVANCED PRACTICE MIDWIFE

## 2025-08-28 PROCEDURE — 6370000000 HC RX 637 (ALT 250 FOR IP)

## 2025-08-28 PROCEDURE — 85027 COMPLETE CBC AUTOMATED: CPT

## 2025-08-28 PROCEDURE — 6370000000 HC RX 637 (ALT 250 FOR IP): Performed by: OBSTETRICS & GYNECOLOGY

## 2025-08-28 RX ORDER — IBUPROFEN 800 MG/1
800 TABLET, FILM COATED ORAL EVERY 8 HOURS SCHEDULED
Qty: 120 TABLET | Refills: 3 | Status: SHIPPED | OUTPATIENT
Start: 2025-08-28

## 2025-08-28 RX ORDER — FERROUS SULFATE 325(65) MG
325 TABLET ORAL 2 TIMES DAILY WITH MEALS
Qty: 30 TABLET | Refills: 3 | Status: SHIPPED | OUTPATIENT
Start: 2025-08-28

## 2025-08-28 RX ORDER — FERROUS SULFATE 325(65) MG
325 TABLET ORAL 2 TIMES DAILY WITH MEALS
Status: DISCONTINUED | OUTPATIENT
Start: 2025-08-28 | End: 2025-08-28 | Stop reason: HOSPADM

## 2025-08-28 RX ADMIN — DOCUSATE SODIUM 100 MG: 100 CAPSULE, LIQUID FILLED ORAL at 09:03

## 2025-08-28 RX ADMIN — FERROUS SULFATE TAB 325 MG (65 MG ELEMENTAL FE) 325 MG: 325 (65 FE) TAB at 13:49

## 2025-08-28 RX ADMIN — IBUPROFEN 800 MG: 400 TABLET, FILM COATED ORAL at 09:02

## 2025-08-28 RX ADMIN — FAMOTIDINE 20 MG: 20 TABLET, FILM COATED ORAL at 09:03

## 2025-08-28 RX ADMIN — ACETAMINOPHEN 1000 MG: 500 TABLET ORAL at 06:21

## 2025-08-28 ASSESSMENT — PAIN DESCRIPTION - DESCRIPTORS
DESCRIPTORS: ACHING;SORE
DESCRIPTORS: ACHING

## 2025-08-28 ASSESSMENT — PAIN SCALES - GENERAL
PAINLEVEL_OUTOF10: 3
PAINLEVEL_OUTOF10: 2

## 2025-08-28 ASSESSMENT — PAIN DESCRIPTION - LOCATION
LOCATION: PERINEUM;VAGINA
LOCATION: VAGINA

## 2025-08-28 ASSESSMENT — PAIN - FUNCTIONAL ASSESSMENT
PAIN_FUNCTIONAL_ASSESSMENT: ACTIVITIES ARE NOT PREVENTED
PAIN_FUNCTIONAL_ASSESSMENT: 0-10
PAIN_FUNCTIONAL_ASSESSMENT: 0-10

## 2025-08-28 ASSESSMENT — PAIN DESCRIPTION - ORIENTATION
ORIENTATION: LOWER
ORIENTATION: LOWER